# Patient Record
Sex: FEMALE | Race: OTHER | HISPANIC OR LATINO | URBAN - METROPOLITAN AREA
[De-identification: names, ages, dates, MRNs, and addresses within clinical notes are randomized per-mention and may not be internally consistent; named-entity substitution may affect disease eponyms.]

---

## 2021-03-08 ENCOUNTER — INPATIENT (INPATIENT)
Facility: HOSPITAL | Age: 26
LOS: 1 days | Discharge: HOME | End: 2021-03-10
Attending: STUDENT IN AN ORGANIZED HEALTH CARE EDUCATION/TRAINING PROGRAM | Admitting: STUDENT IN AN ORGANIZED HEALTH CARE EDUCATION/TRAINING PROGRAM
Payer: MEDICAID

## 2021-03-08 VITALS
RESPIRATION RATE: 16 BRPM | HEART RATE: 105 BPM | DIASTOLIC BLOOD PRESSURE: 68 MMHG | TEMPERATURE: 100 F | OXYGEN SATURATION: 98 % | SYSTOLIC BLOOD PRESSURE: 112 MMHG

## 2021-03-08 LAB
ALBUMIN SERPL ELPH-MCNC: 4.1 G/DL — SIGNIFICANT CHANGE UP (ref 3.3–5)
ALP SERPL-CCNC: 82 U/L — SIGNIFICANT CHANGE UP (ref 40–120)
ALT FLD-CCNC: 83 U/L — HIGH (ref 4–33)
ANION GAP SERPL CALC-SCNC: 9 MMOL/L — SIGNIFICANT CHANGE UP (ref 7–14)
AST SERPL-CCNC: 58 U/L — HIGH (ref 4–32)
BILIRUB SERPL-MCNC: <0.2 MG/DL — SIGNIFICANT CHANGE UP (ref 0.2–1.2)
BUN SERPL-MCNC: 4 MG/DL — LOW (ref 7–23)
CALCIUM SERPL-MCNC: 8.5 MG/DL — SIGNIFICANT CHANGE UP (ref 8.4–10.5)
CHLORIDE SERPL-SCNC: 103 MMOL/L — SIGNIFICANT CHANGE UP (ref 98–107)
CO2 SERPL-SCNC: 27 MMOL/L — SIGNIFICANT CHANGE UP (ref 22–31)
CREAT SERPL-MCNC: 0.67 MG/DL — SIGNIFICANT CHANGE UP (ref 0.5–1.3)
GLUCOSE SERPL-MCNC: 104 MG/DL — HIGH (ref 70–99)
HCT VFR BLD CALC: 36.9 % — SIGNIFICANT CHANGE UP (ref 34.5–45)
HGB BLD-MCNC: 12.1 G/DL — SIGNIFICANT CHANGE UP (ref 11.5–15.5)
IANC: 3.28 K/UL — SIGNIFICANT CHANGE UP (ref 1.5–8.5)
MCHC RBC-ENTMCNC: 28.1 PG — SIGNIFICANT CHANGE UP (ref 27–34)
MCHC RBC-ENTMCNC: 32.8 GM/DL — SIGNIFICANT CHANGE UP (ref 32–36)
MCV RBC AUTO: 85.8 FL — SIGNIFICANT CHANGE UP (ref 80–100)
PLATELET # BLD AUTO: 118 K/UL — LOW (ref 150–400)
POTASSIUM SERPL-MCNC: 3.8 MMOL/L — SIGNIFICANT CHANGE UP (ref 3.5–5.3)
POTASSIUM SERPL-SCNC: 3.8 MMOL/L — SIGNIFICANT CHANGE UP (ref 3.5–5.3)
PROT SERPL-MCNC: 7.1 G/DL — SIGNIFICANT CHANGE UP (ref 6–8.3)
RBC # BLD: 4.3 M/UL — SIGNIFICANT CHANGE UP (ref 3.8–5.2)
RBC # FLD: 13.5 % — SIGNIFICANT CHANGE UP (ref 10.3–14.5)
SODIUM SERPL-SCNC: 139 MMOL/L — SIGNIFICANT CHANGE UP (ref 135–145)
WBC # BLD: 4.91 K/UL — SIGNIFICANT CHANGE UP (ref 3.8–10.5)
WBC # FLD AUTO: 4.91 K/UL — SIGNIFICANT CHANGE UP (ref 3.8–10.5)

## 2021-03-08 PROCEDURE — 93010 ELECTROCARDIOGRAM REPORT: CPT

## 2021-03-08 PROCEDURE — 99285 EMERGENCY DEPT VISIT HI MDM: CPT | Mod: 25

## 2021-03-08 PROCEDURE — 71045 X-RAY EXAM CHEST 1 VIEW: CPT | Mod: 26

## 2021-03-08 RX ORDER — MAGNESIUM SULFATE 500 MG/ML
2 VIAL (ML) INJECTION ONCE
Refills: 0 | Status: COMPLETED | OUTPATIENT
Start: 2021-03-08 | End: 2021-03-08

## 2021-03-08 RX ORDER — SODIUM CHLORIDE 9 MG/ML
1000 INJECTION INTRAMUSCULAR; INTRAVENOUS; SUBCUTANEOUS ONCE
Refills: 0 | Status: COMPLETED | OUTPATIENT
Start: 2021-03-08 | End: 2021-03-08

## 2021-03-08 RX ORDER — KETOROLAC TROMETHAMINE 30 MG/ML
30 SYRINGE (ML) INJECTION ONCE
Refills: 0 | Status: DISCONTINUED | OUTPATIENT
Start: 2021-03-08 | End: 2021-03-08

## 2021-03-08 RX ORDER — ALBUTEROL 90 UG/1
2.5 AEROSOL, METERED ORAL
Refills: 0 | Status: COMPLETED | OUTPATIENT
Start: 2021-03-08 | End: 2021-03-08

## 2021-03-08 RX ORDER — ONDANSETRON 8 MG/1
4 TABLET, FILM COATED ORAL ONCE
Refills: 0 | Status: COMPLETED | OUTPATIENT
Start: 2021-03-08 | End: 2021-03-08

## 2021-03-08 RX ADMIN — Medication 30 MILLIGRAM(S): at 20:37

## 2021-03-08 RX ADMIN — SODIUM CHLORIDE 1000 MILLILITER(S): 9 INJECTION INTRAMUSCULAR; INTRAVENOUS; SUBCUTANEOUS at 20:37

## 2021-03-08 RX ADMIN — ONDANSETRON 4 MILLIGRAM(S): 8 TABLET, FILM COATED ORAL at 21:08

## 2021-03-08 RX ADMIN — Medication 30 MILLIGRAM(S): at 22:22

## 2021-03-08 RX ADMIN — Medication 100 MILLIGRAM(S): at 20:37

## 2021-03-08 RX ADMIN — Medication 40 MILLIGRAM(S): at 21:18

## 2021-03-08 RX ADMIN — ALBUTEROL 2.5 MILLIGRAM(S): 90 AEROSOL, METERED ORAL at 21:08

## 2021-03-08 RX ADMIN — SODIUM CHLORIDE 1000 MILLILITER(S): 9 INJECTION INTRAMUSCULAR; INTRAVENOUS; SUBCUTANEOUS at 22:23

## 2021-03-08 RX ADMIN — ALBUTEROL 2.5 MILLIGRAM(S): 90 AEROSOL, METERED ORAL at 20:50

## 2021-03-08 NOTE — ED PROVIDER NOTE - PROGRESS NOTE DETAILS
SAIGE Munson: Pt reassessed, states breathing is somewhat better however pt is tachycardic, still wheezing and is stable yet appears uncomfortable and ill. Paged the hospitalist, planning admission to medicine.

## 2021-03-08 NOTE — ED PROVIDER NOTE - OBJECTIVE STATEMENT
24 Y/O F PMH Pre-DM, HLD states that she was diagnosed with COVID-19 on 3/1. Pt states that she has been having SOB x 5 days. Pt states she has been taking Tylenol for her fever and pain but states she has been having a persistent fever in spite of the Tylenol. Pt also endorses pleuritic CP worse with deep breathing. Pt also states that she has been nauseous and fatigued. Pt denies any other sx or acute complaints.

## 2021-03-08 NOTE — ED PROVIDER NOTE - CARE PLAN
Principal Discharge DX:	Chest tightness  Secondary Diagnosis:	Viral syndrome  Secondary Diagnosis:	Fatigue   Principal Discharge DX:	Chest tightness  Secondary Diagnosis:	Viral syndrome  Secondary Diagnosis:	Fatigue  Secondary Diagnosis:	COVID-19   Principal Discharge DX:	Pneumonia  Secondary Diagnosis:	COVID-19

## 2021-03-08 NOTE — ED PROVIDER NOTE - ATTENDING CONTRIBUTION TO CARE
Seen and examined, 7d hx of cough, 5d of fevers and SOB, now with inc. pleuritic CP, no leg c/o, no N/V/D. Pt. c/o exertional dyspnea for past 2d, no orthopnea, no edema, no PND. Denies hx of asthma but has used inhaler in the past. MMM, scant wheezing, heart reg, abd soft, NT to palp, no CVAT, no edema, NT calves, good pulses.

## 2021-03-08 NOTE — ED ADULT TRIAGE NOTE - CHIEF COMPLAINT QUOTE
Pt presents to ED for chest pain, body aches, fevers, SOB x5days. Pt states Tmax 103F and " Since yesterday I have not been able to break this fever and it got to a point where I felt I was going to faint." Pt states she tested positive for COVID on 3/1.  Pmhx of migraines.

## 2021-03-08 NOTE — ED ADULT NURSE NOTE - OBJECTIVE STATEMENT
Pt received to intake room 3. Pt A&Ox4, ambulatory. Pt states that she tested positive for COVID19 at 3/1 after symptoms started on 2/27. Pt endorses worsening SOB, body aches, bone painl, nausea without vomiting, fevers and chills unrelieved by Tylenol, headaches and weakness. Respirations equal and unlabored, no acute distress noted. Pt currently sating 98% on room air. Denies chest pain, palpitations. 20g IV placed in left AC, labs drawn and sent as ordered. Cardiac monitor in place, sinus rhythm noted. Vital signs as noted, comfort measures provided, call bell within reach. Will continue to monitor.

## 2021-03-08 NOTE — ED PROVIDER NOTE - CLINICAL SUMMARY MEDICAL DECISION MAKING FREE TEXT BOX
24 Y/O F PMH Pre-DM, HLD states that she was diagnosed with COVID-19 on 3/1. Pt states that she has been having SOB x 5 days. Pt states she has been taking Tylenol for her fever and pain but states she has been having a persistent fever in spite of the Tylenol. Plan is labs to eval for anemia or electrolyte disturbance and CXR to eval for consolidation, Albuterol for symptoms, no calf tenderness, will hydrate, likely eventual D/C. 24 Y/O F PMH Pre-DM, HLD states that she was diagnosed with COVID-19 on 3/1. Pt states that she has been having SOB x 5 days. Pt states she has been taking Tylenol for her fever and pain but states she has been having a persistent fever in spite of the Tylenol. Plan is labs to eval for anemia or electrolyte disturbance and CXR to eval for consolidation, Albuterol for symptoms, no calf tenderness, will hydrate, poss. D/C if improves.

## 2021-03-09 DIAGNOSIS — R07.89 OTHER CHEST PAIN: ICD-10-CM

## 2021-03-09 DIAGNOSIS — U07.1 COVID-19: ICD-10-CM

## 2021-03-09 DIAGNOSIS — Z29.9 ENCOUNTER FOR PROPHYLACTIC MEASURES, UNSPECIFIED: ICD-10-CM

## 2021-03-09 DIAGNOSIS — R09.1 PLEURISY: ICD-10-CM

## 2021-03-09 LAB
A1C WITH ESTIMATED AVERAGE GLUCOSE RESULT: 5.8 % — HIGH (ref 4–5.6)
ANION GAP SERPL CALC-SCNC: 9 MMOL/L — SIGNIFICANT CHANGE UP (ref 7–14)
APTT BLD: 30.4 SEC — SIGNIFICANT CHANGE UP (ref 27–36.3)
BASE EXCESS BLDV CALC-SCNC: -0.3 MMOL/L — SIGNIFICANT CHANGE UP (ref -3–2)
BASOPHILS # BLD AUTO: 0 K/UL — SIGNIFICANT CHANGE UP (ref 0–0.2)
BASOPHILS NFR BLD AUTO: 0 % — SIGNIFICANT CHANGE UP (ref 0–2)
BUN SERPL-MCNC: 4 MG/DL — LOW (ref 7–23)
CALCIUM SERPL-MCNC: 8.3 MG/DL — LOW (ref 8.4–10.5)
CHLORIDE SERPL-SCNC: 105 MMOL/L — SIGNIFICANT CHANGE UP (ref 98–107)
CK SERPL-CCNC: 37 U/L — SIGNIFICANT CHANGE UP (ref 25–170)
CK SERPL-CCNC: 49 U/L — SIGNIFICANT CHANGE UP (ref 25–170)
CO2 SERPL-SCNC: 24 MMOL/L — SIGNIFICANT CHANGE UP (ref 22–31)
CREAT SERPL-MCNC: 0.5 MG/DL — SIGNIFICANT CHANGE UP (ref 0.5–1.3)
CRP SERPL-MCNC: 45.6 MG/L — HIGH
D DIMER BLD IA.RAPID-MCNC: 398 NG/ML DDU — HIGH
EOSINOPHIL # BLD AUTO: 0 K/UL — SIGNIFICANT CHANGE UP (ref 0–0.5)
EOSINOPHIL NFR BLD AUTO: 0 % — SIGNIFICANT CHANGE UP (ref 0–6)
ESTIMATED AVERAGE GLUCOSE: 120 MG/DL — HIGH (ref 68–114)
FERRITIN SERPL-MCNC: 400 NG/ML — HIGH (ref 15–150)
FIBRINOGEN PPP-MCNC: 579 MG/DL — HIGH (ref 290–520)
GLUCOSE SERPL-MCNC: 165 MG/DL — HIGH (ref 70–99)
HCO3 BLDV-SCNC: 24 MMOL/L — SIGNIFICANT CHANGE UP (ref 20–27)
IMM GRANULOCYTES NFR BLD AUTO: 0.4 % — SIGNIFICANT CHANGE UP (ref 0–1.5)
INR BLD: 1.14 RATIO — SIGNIFICANT CHANGE UP (ref 0.88–1.16)
LYMPHOCYTES # BLD AUTO: 1.42 K/UL — SIGNIFICANT CHANGE UP (ref 1–3.3)
LYMPHOCYTES # BLD AUTO: 28.9 % — SIGNIFICANT CHANGE UP (ref 13–44)
MONOCYTES # BLD AUTO: 0.19 K/UL — SIGNIFICANT CHANGE UP (ref 0–0.9)
MONOCYTES NFR BLD AUTO: 3.9 % — SIGNIFICANT CHANGE UP (ref 2–14)
NEUTROPHILS # BLD AUTO: 3.28 K/UL — SIGNIFICANT CHANGE UP (ref 1.8–7.4)
NEUTROPHILS NFR BLD AUTO: 66.8 % — SIGNIFICANT CHANGE UP (ref 43–77)
NRBC # BLD: 0 /100 WBCS — SIGNIFICANT CHANGE UP
NRBC # FLD: 0 K/UL — SIGNIFICANT CHANGE UP
PCO2 BLDV: 36 MMHG — LOW (ref 41–51)
PH BLDV: 7.42 — SIGNIFICANT CHANGE UP (ref 7.32–7.43)
PO2 BLDV: 68 MMHG — HIGH (ref 35–40)
POTASSIUM SERPL-MCNC: 4.5 MMOL/L — SIGNIFICANT CHANGE UP (ref 3.5–5.3)
POTASSIUM SERPL-SCNC: 4.5 MMOL/L — SIGNIFICANT CHANGE UP (ref 3.5–5.3)
PROCALCITONIN SERPL-MCNC: 0.12 NG/ML — HIGH (ref 0.02–0.1)
PROTHROM AB SERPL-ACNC: 13 SEC — SIGNIFICANT CHANGE UP (ref 10.6–13.6)
SAO2 % BLDV: 94 % — HIGH (ref 60–85)
SARS-COV-2 RNA SPEC QL NAA+PROBE: DETECTED
SODIUM SERPL-SCNC: 138 MMOL/L — SIGNIFICANT CHANGE UP (ref 135–145)

## 2021-03-09 PROCEDURE — 71275 CT ANGIOGRAPHY CHEST: CPT | Mod: 26

## 2021-03-09 PROCEDURE — 99223 1ST HOSP IP/OBS HIGH 75: CPT

## 2021-03-09 PROCEDURE — 12345: CPT | Mod: NC

## 2021-03-09 RX ORDER — BENZOCAINE AND MENTHOL 5; 1 G/100ML; G/100ML
1 LIQUID ORAL THREE TIMES A DAY
Refills: 0 | Status: DISCONTINUED | OUTPATIENT
Start: 2021-03-09 | End: 2021-03-10

## 2021-03-09 RX ORDER — ENOXAPARIN SODIUM 100 MG/ML
40 INJECTION SUBCUTANEOUS DAILY
Refills: 0 | Status: DISCONTINUED | OUTPATIENT
Start: 2021-03-09 | End: 2021-03-09

## 2021-03-09 RX ORDER — SODIUM CHLORIDE 9 MG/ML
1000 INJECTION INTRAMUSCULAR; INTRAVENOUS; SUBCUTANEOUS
Refills: 0 | Status: DISCONTINUED | OUTPATIENT
Start: 2021-03-09 | End: 2021-03-09

## 2021-03-09 RX ORDER — IBUPROFEN 200 MG
400 TABLET ORAL EVERY 6 HOURS
Refills: 0 | Status: DISCONTINUED | OUTPATIENT
Start: 2021-03-09 | End: 2021-03-10

## 2021-03-09 RX ORDER — ENOXAPARIN SODIUM 100 MG/ML
40 INJECTION SUBCUTANEOUS EVERY 12 HOURS
Refills: 0 | Status: DISCONTINUED | OUTPATIENT
Start: 2021-03-09 | End: 2021-03-10

## 2021-03-09 RX ADMIN — Medication 50 GRAM(S): at 00:32

## 2021-03-09 RX ADMIN — BENZOCAINE AND MENTHOL 1 LOZENGE: 5; 1 LIQUID ORAL at 21:35

## 2021-03-09 RX ADMIN — SODIUM CHLORIDE 100 MILLILITER(S): 9 INJECTION INTRAMUSCULAR; INTRAVENOUS; SUBCUTANEOUS at 10:52

## 2021-03-09 RX ADMIN — ENOXAPARIN SODIUM 40 MILLIGRAM(S): 100 INJECTION SUBCUTANEOUS at 21:35

## 2021-03-09 RX ADMIN — SODIUM CHLORIDE 100 MILLILITER(S): 9 INJECTION INTRAMUSCULAR; INTRAVENOUS; SUBCUTANEOUS at 06:07

## 2021-03-09 RX ADMIN — Medication 400 MILLIGRAM(S): at 22:35

## 2021-03-09 RX ADMIN — Medication 400 MILLIGRAM(S): at 14:45

## 2021-03-09 RX ADMIN — Medication 400 MILLIGRAM(S): at 15:30

## 2021-03-09 RX ADMIN — Medication 400 MILLIGRAM(S): at 21:35

## 2021-03-09 NOTE — H&P ADULT - PROBLEM SELECTOR PLAN 1
-pt saturating above  currently  on RA, so no indication currently to tread with steroids or remdesivir   -supportive care with tylenol, nsaid, IVF  -dvt ppx

## 2021-03-09 NOTE — H&P ADULT - PROBLEM SELECTOR PLAN 2
-pleurisy and dyspnea with minimal exertion in setting clear cxr raises concern for PE; CTA chest ordered. Pt currently menstruating

## 2021-03-09 NOTE — H&P ADULT - NSHPLABSRESULTS_GEN_ALL_CORE
12.1   4.91  )-----------( 118      ( 08 Mar 2021 21:12 )             36.9     03-08    139  |  103  |  4<L>  ----------------------------<  104<H>  3.8   |  27  |  0.67    Ca    8.5      08 Mar 2021 21:12    TPro  7.1  /  Alb  4.1  /  TBili  <0.2  /  DBili  x   /  AST  58<H>  /  ALT  83<H>  /  AlkPhos  82  03-08    CAPILLARY BLOOD GLUCOSE            Vital Signs Last 24 Hrs  T(C): 37.2 (09 Mar 2021 00:40), Max: 39 (08 Mar 2021 20:54)  T(F): 99 (09 Mar 2021 00:40), Max: 102.2 (08 Mar 2021 20:54)  HR: 97 (09 Mar 2021 00:40) (97 - 124)  BP: 102/55 (09 Mar 2021 00:40) (100/51 - 114/41)  BP(mean): --  RR: 20 (09 Mar 2021 00:40) (16 - 24)  SpO2: 96% (09 Mar 2021 00:40) (96% - 98%)

## 2021-03-09 NOTE — H&P ADULT - NSHPREVIEWOFSYSTEMS_GEN_ALL_CORE
Review of Systems:   CONSTITUTIONAL: + fever,  fatigue  EYES: No eye pain, visual disturbances, or discharge  ENMT:  No difficulty hearing, tinnitus, vertigo; No sinus or throat pain  NECK: No pain or stiffness  RESPIRATORY: + cough, pluerisy, FORD  CARDIOVASCULAR: No palpitations, dizziness, or leg swelling  GASTROINTESTINAL: No abdominal or epigastric pain. No nausea, vomiting, or hematemesis; No diarrhea or constipation. No melena or hematochezia.  GENITOURINARY: No dysuria, frequency, hematuria, or incontinence  NEUROLOGICAL: No headaches  SKIN: No itching, burning, rashes, or lesions   LYMPH NODES: No enlarged glands  ENDOCRINE: No heat or cold intolerance; No hair loss  MUSCULOSKELETAL: myalgias   ALLERY AND IMMUNOLOGIC: No hives or eczema

## 2021-03-09 NOTE — H&P ADULT - HISTORY OF PRESENT ILLNESS
24 yo f covid+ march 1st; in last few days, pt reports high-grade fevers suboptimally responsive to tylenol as well as generalized weakness, myalgias, cough. Also endorses pleurisy as well as dyspnea with minimal exertion. CXR prelim clear. D-dimer 398. Presented with , increased to 124 after 2 rounds of albuterol. Currently 100. Tmax 102.2 (O) 26 yo f covid+ march 1st; in last few days, pt reports high-grade fevers suboptimally responsive to tylenol as well as generalized weakness, myalgias, cough. Also endorses pleurisy as well as dyspnea with minimal exertion. CXR prelim clear. D-dimer 398. Presented with , increased to 124 after 2 rounds of albuterol. Currently . Tmax 102.2 (O). Denies h/o copd or asthma

## 2021-03-10 VITALS
SYSTOLIC BLOOD PRESSURE: 102 MMHG | TEMPERATURE: 99 F | DIASTOLIC BLOOD PRESSURE: 62 MMHG | HEART RATE: 79 BPM | OXYGEN SATURATION: 98 % | RESPIRATION RATE: 20 BRPM

## 2021-03-10 DIAGNOSIS — R74.01 ELEVATION OF LEVELS OF LIVER TRANSAMINASE LEVELS: ICD-10-CM

## 2021-03-10 LAB
D DIMER BLD IA.RAPID-MCNC: 186 NG/ML DDU — SIGNIFICANT CHANGE UP
HCT VFR BLD CALC: 36.9 % — SIGNIFICANT CHANGE UP (ref 34.5–45)
HGB BLD-MCNC: 11.9 G/DL — SIGNIFICANT CHANGE UP (ref 11.5–15.5)
MCHC RBC-ENTMCNC: 27.9 PG — SIGNIFICANT CHANGE UP (ref 27–34)
MCHC RBC-ENTMCNC: 32.2 GM/DL — SIGNIFICANT CHANGE UP (ref 32–36)
MCV RBC AUTO: 86.4 FL — SIGNIFICANT CHANGE UP (ref 80–100)
NRBC # BLD: 0 /100 WBCS — SIGNIFICANT CHANGE UP
NRBC # FLD: 0 K/UL — SIGNIFICANT CHANGE UP
PLATELET # BLD AUTO: 171 K/UL — SIGNIFICANT CHANGE UP (ref 150–400)
RBC # BLD: 4.27 M/UL — SIGNIFICANT CHANGE UP (ref 3.8–5.2)
RBC # FLD: 13.6 % — SIGNIFICANT CHANGE UP (ref 10.3–14.5)
WBC # BLD: 4.3 K/UL — SIGNIFICANT CHANGE UP (ref 3.8–10.5)
WBC # FLD AUTO: 4.3 K/UL — SIGNIFICANT CHANGE UP (ref 3.8–10.5)

## 2021-03-10 PROCEDURE — 99238 HOSP IP/OBS DSCHRG MGMT 30/<: CPT

## 2021-03-10 RX ORDER — IBUPROFEN 200 MG
1 TABLET ORAL
Qty: 0 | Refills: 0 | DISCHARGE
Start: 2021-03-10

## 2021-03-10 RX ORDER — ACETAMINOPHEN 500 MG
1000 TABLET ORAL ONCE
Refills: 0 | Status: DISCONTINUED | OUTPATIENT
Start: 2021-03-10 | End: 2021-03-10

## 2021-03-10 RX ADMIN — Medication 400 MILLIGRAM(S): at 05:48

## 2021-03-10 RX ADMIN — BENZOCAINE AND MENTHOL 1 LOZENGE: 5; 1 LIQUID ORAL at 05:48

## 2021-03-10 RX ADMIN — Medication 400 MILLIGRAM(S): at 06:48

## 2021-03-10 RX ADMIN — ENOXAPARIN SODIUM 40 MILLIGRAM(S): 100 INJECTION SUBCUTANEOUS at 09:00

## 2021-03-10 NOTE — PROGRESS NOTE ADULT - PROBLEM SELECTOR PLAN 1
-pt sating well on RA, so no indication currently to tread with steroids or remdesivir   -supportive care prn  -dvt ppx  -Monitor inflammatory markers
Continues to have no O2 req  Does not meet criteria for rem/dex  chest pain has resolved  c/w supportive care tylenol PRN, fluids

## 2021-03-10 NOTE — DISCHARGE NOTE PROVIDER - NSDCMRMEDTOKEN_GEN_ALL_CORE_FT
ibuprofen 400 mg oral tablet: 1 tab(s) orally every 6 hours, As needed, Temp greater or equal to 38C (100.4F), Mild Pain (1 - 3), Moderate Pain (4 - 6)  Tessalon Perles 100 mg oral capsule: 1 cap(s) orally 3 times a day

## 2021-03-10 NOTE — DISCHARGE NOTE NURSING/CASE MANAGEMENT/SOCIAL WORK - PATIENT PORTAL LINK FT
You can access the FollowMyHealth Patient Portal offered by [facility] by registering at the following website: [email or web address]. By joining [facility]’s FollowMyHealth portal, you will also be able to view your health information using other applications (apps) compatible with our system.

## 2021-03-10 NOTE — PROGRESS NOTE ADULT - PROBLEM SELECTOR PLAN 2
-pleurisy and dyspnea with minimal exertion in setting clear cxr and negative PE study.  -prn pain control. Monitor sx.
Moderate ALT/AST elevation in setting of acute viral illness  monitor trend while inpatient

## 2021-03-10 NOTE — DISCHARGE NOTE PROVIDER - HOSPITAL COURSE
26 yo f covid + with pleurisy, cisse    COVID-19.  Plan: -pt sating well on RA, so no indication currently to tread with steroids or remdesivir   -supportive care prn  -dvt ppx  -Monitor inflammatory markers.      Problem/Plan - 2:  ·  Problem: Pleurisy.  Plan: -pleurisy and dyspnea with minimal exertion in setting clear cxr and negative PE study.  -prn pain control. Monitor sx.      Problem/Plan - 3:  ·  Problem: DVT prophylaxis.  Plan: Lovenox   26 yo f with history of Migraines, Pre-Diabetes, Hyperplidemia found to be covid +. Also complained of chest pain and dyspnea on exertion     COVID-19:  -pt sating well on RA, so no indication currently to tread with steroids or remdesivir as pt is not needing supplemental oxygen       Pleurisy: pleurisy and dyspnea with minimal exertion in setting clear cxr and negative PE study. Pain control offered    26 yo f with history of Migraines, Pre-Diabetes, Hyperplidemia found to be covid +. Also complained of chest pain and dyspnea on exertion     COVID-19:  -pt sating well on RA, so no indication currently to tread with steroids or remdesivir as pt is not needing supplemental oxygen       Pleurisy: pleurisy and dyspnea with minimal exertion in setting clear cxr and negative PE study. Pain control offered     Discussed case with attending. Pt is doing well and currently has no complaints. Explained to pt and opportunity was given to ask questions and answers. Pt is comfortable and sating well. No complaints at this time. Attending in agreement for discharge

## 2021-03-10 NOTE — DISCHARGE NOTE PROVIDER - NSDCCPCAREPLAN_GEN_ALL_CORE_FT
PRINCIPAL DISCHARGE DIAGNOSIS  Diagnosis: COVID-19  Assessment and Plan of Treatment: You have been diagnosed with the COVID-19 virus during your hospital stay. You must self quarantine to complete a 14 day time period.  Monitor for fevers, shortness of breath and cough primarily.  Monitor your temperature daily to notice any changes and increases.    It has been determined that you no longer need hospitalization and can recover while remaining in self-quarantine at home. You should follow the prevention steps below until a healthcare provider or local or state health department says you can return to your normal activities.  1. You should restrict activities outside your home, except for getting medical care.  2. Do not go to work, school, or public areas.  3. Avoid using public transportation, ride-sharing, or taxis.  4. Separate yourself from other people and animals in your home.  5. Call ahead before visiting your doctor.  6. Wear a facemask.  7. Cover your coughs and sneezes.  8. Clean your hands often.  9. Avoid sharing personal household items.  10. Clean all “high-touch” surfaces everyday.  11. Monitor your symptoms.  If you have a medical emergency and need to call 911, notify the dispatch personnel that you have COVID-19 If possible, put on a facemask before emergency medical services arrive.  12. Stopping home isolation.  Patients with confirmed COVID-19 should remain under home isolation precautions for 14 days since the positive COVID-19 test and until the risk of secondary transmission to others is thought to be low. The decision to discontinue home isolation precautions should be made on a case-by-case basis, in consultation with healthcare providers and state and local health departments. Your [facility] Department of Health can be reached at [phone] for further information about COVID-19.         SECONDARY DISCHARGE DIAGNOSES  Diagnosis: Pre-diabetes  Assessment and Plan of Treatment: You are a pre-diabetic. It is important to keep a low cardbohydrate diet and exercise daily. You should monitor your hgb A1C routinely with your primary care physician    Diagnosis: Pleurisy  Assessment and Plan of Treatment: The chest pain you experienced was related to COVID infection. This was not related to your heart and you did not have a pulmonary embolism. You recieved a work up for this. You can take tylenol 650mg every 4-6 hours for pain     PRINCIPAL DISCHARGE DIAGNOSIS  Diagnosis: COVID-19  Assessment and Plan of Treatment: You have been diagnosed with the COVID-19 virus during your hospital stay. You must self quarantine to complete a 14 day time period.  Monitor for fevers, shortness of breath and cough primarily.  Monitor your temperature daily to notice any changes and increases.    It has been determined that you no longer need hospitalization and can recover while remaining in self-quarantine at home. You should follow the prevention steps below until a healthcare provider or local or state health department says you can return to your normal activities.  1. You should restrict activities outside your home, except for getting medical care.  2. Do not go to work, school, or public areas.  3. Avoid using public transportation, ride-sharing, or taxis.  4. Separate yourself from other people and animals in your home.  5. Call ahead before visiting your doctor.  6. Wear a facemask.  7. Cover your coughs and sneezes.  8. Clean your hands often.  9. Avoid sharing personal household items.  10. Clean all “high-touch” surfaces everyday.  11. Monitor your symptoms.  If you have a medical emergency and need to call 911, notify the dispatch personnel that you have COVID-19 If possible, put on a facemask before emergency medical services arrive.  12. Stopping home isolation.  Patients with confirmed COVID-19 should remain under home isolation precautions for 14 days since the positive COVID-19 test and until the risk of secondary transmission to others is thought to be low. The decision to discontinue home isolation precautions should be made on a case-by-case basis, in consultation with healthcare providers and state and local health departments. Your [facility] Department of Health can be reached at [phone] for further information about COVID-19.   Follow up with your private doctor at [facility]. Also go to [facility] to  your prescription for cough. Take as directed         SECONDARY DISCHARGE DIAGNOSES  Diagnosis: Pre-diabetes  Assessment and Plan of Treatment: You are a pre-diabetic. It is important to keep a low cardbohydrate diet and exercise daily. You should monitor your hgb A1C routinely with your primary care physician    Diagnosis: Pleurisy  Assessment and Plan of Treatment: The chest pain you experienced was related to COVID infection. This was not related to your heart and you did not have a pulmonary embolism. You recieved a work up for this. You can take tylenol 650mg every 4-6 hours for pain

## 2021-03-10 NOTE — DISCHARGE NOTE PROVIDER - CARE PROVIDER_API CALL
[facility],   [address] [address]  Phone: [phone]  Fax: (   )    -  Established Patient  Follow Up Time: 1 week

## 2021-03-10 NOTE — DISCHARGE NOTE PROVIDER - PROVIDER TOKENS
FREE:[LAST:[[facility]],PHONE:[phone]],FAX:[(   )    -],ADDRESS:[[address] [place] [address]],FOLLOWUP:[1 week],ESTABLISHEDPATIENT:[T]]

## 2021-03-10 NOTE — PROGRESS NOTE ADULT - SUBJECTIVE AND OBJECTIVE BOX
Patient is a 25y old  Female who presents with a chief complaint of worsening covid (09 Mar 2021 02:07)    SUBJECTIVE / OVERNIGHT EVENTS: No acute events overnight. Reports SOB subjectively at the time of this eval.    MEDICATIONS  (STANDING):  enoxaparin Injectable 40 milliGRAM(s) SubCutaneous every 12 hours    MEDICATIONS  (PRN):  benzocaine 15 mG/menthol 3.6 mG (Sugar-Free) Lozenge 1 Lozenge Oral three times a day PRN Sore Throat  ibuprofen  Tablet. 400 milliGRAM(s) Oral every 6 hours PRN Temp greater or equal to 38C (100.4F), Mild Pain (1 - 3), Moderate Pain (4 - 6)    T(C): 36.5 (03-09-21 @ 13:57), Max: 39 (03-08-21 @ 20:54)  HR: 95 (03-09-21 @ 13:57) (88 - 124)  BP: 117/73 (03-09-21 @ 13:57) (100/51 - 118/70)  RR: 18 (03-09-21 @ 13:57) (16 - 24)  SpO2: 97% (03-09-21 @ 13:57) (96% - 98%)  PHYSICAL EXAM:  GENERAL: NAD, well-developed  HEAD:  Atraumatic, Normocephalic  EYES: EOMI, PERRLA, conjunctiva and sclera clear  NECK: Supple, No JVD  CHEST/LUNG: Clear to auscultation bilaterally; No wheeze  HEART: Regular rate and rhythm; No murmurs, rubs, or gallops  ABDOMEN: Soft, Nontender, Nondistended; Bowel sounds present  EXTREMITIES:  2+ Peripheral Pulses, No clubbing, cyanosis, or edema  PSYCH: AAOx3  NEUROLOGY: non-focal  SKIN: No rashes or lesions    LABS:                        12.1   4.91  )-----------( 118      ( 08 Mar 2021 21:12 )             36.9     WBC Trend: 4.91<--  03-09    138  |  105  |  4<L>  ----------------------------<  165<H>  4.5   |  24  |  0.50    Ca    8.3<L>      09 Mar 2021 06:49    TPro  7.1  /  Alb  4.1  /  TBili  <0.2  /  DBili  x   /  AST  58<H>  /  ALT  83<H>  /  AlkPhos  82  03-08    Creatinine Trend: 0.50<--, 0.67<--  PT/INR - ( 09 Mar 2021 06:52 )   PT: 13.0 sec;   INR: 1.14 ratio         PTT - ( 09 Mar 2021 06:52 )  PTT:30.4 sec  CARDIAC MARKERS ( 09 Mar 2021 06:49 )  x     / x     / 49 U/L / x     / x      CARDIAC MARKERS ( 09 Mar 2021 00:48 )  x     / x     / 37 U/L / x     / x    
Willy Conklin M.D.  Hospitalist 8am-7pm  Division of Hospital Medicine  [facility]  cell [phone]  pager [phone]    Patient is a 25y old  Female who presents with a chief complaint of worsening covid (10 Mar 2021 10:23)    SUBJECTIVE / OVERNIGHT EVENTS: Patient seen and examined. No acute overnight events, no subjective complaints.   Patient denies chest pain, shortness of breath, fevers.  Tele events, if applicable:  SPO2:    OBJECTIVE:  Vital Signs Last 24 Hrs  T(C): 37.1 (10 Mar 2021 10:00), Max: 37.1 (10 Mar 2021 10:00)  T(F): 98.7 (10 Mar 2021 10:00), Max: 98.7 (10 Mar 2021 10:00)  HR: 79 (10 Mar 2021 10:00) (79 - 95)  BP: 102/62 (10 Mar 2021 10:00) (100/60 - 117/73)  RR: 20 (10 Mar 2021 10:00) (16 - 20)  SpO2: 98% (10 Mar 2021 10:00) (97% - 99%)    I&O's Summary    10 Mar 2021 07:01  -  10 Mar 2021 11:40  --------------------------------------------------------  IN: 250 mL / OUT: 0 mL / NET: 250 mL      Physical Examination:  GEN: young female well appearing in NAD  HEAD/NECK: Normocephalic, atraumatic  RESP: no accessory muscle use, B/L air entry, scattered rhonchi, no wheezing  CARDIO: regular rate/rhythm  ABD: soft, NT, ND, +BS  PSYCH: A&Ox3, normal affect  SKIN: warm, dry, in tact, no rashes  NEURO: no focal neurologic deficits appreciated  EXT: no lower extremity edema, no cyanosis  VASC: good peripheral pulses      (03-10 @ 11:04)                      11.9  4.30 )-----------( 171                 36.9    03-09    138  |  105  |  4<L>  ----------------------------<  165<H>  4.5   |  24  |  0.50    Ca    8.3<L>      09 Mar 2021 06:49    TPro  7.1  /  Alb  4.1  /  TBili  <0.2  /  DBili  x   /  AST  58<H>  /  ALT  83<H>  /  AlkPhos  82  03-08    ( 09 Mar 2021 06:52 )   PT: 13.0 sec;   INR: 1.14 ratio;       PTT:30.4 sec  CARDIAC MARKERS ( 09 Mar 2021 06:49 )  Trop x     / CK 49 U/L / CKMB x       CARDIAC MARKERS ( 09 Mar 2021 00:48 )  Trop x     / CK 37 U/L / CKMB x         COVID-19 PCR: Detected (09 Mar 2021 00:57)    Prothrombin Time, Plasma: 13.0 sec (03-09-21 @ 06:52)    Activated Partial Thromboplastin Time: 30.4 sec (03-09-21 @ 06:52)    I&O's Summary    10 Mar 2021 07:01  -  10 Mar 2021 11:40  --------------------------------------------------------  IN: 250 mL / OUT: 0 mL / NET: 250 mL    Consultant(s) Notes Reviewed:     MEDICATIONS  (STANDING):  enoxaparin Injectable 40 milliGRAM(s) SubCutaneous every 12 hours    MEDICATIONS  (PRN):  benzocaine 15 mG/menthol 3.6 mG (Sugar-Free) Lozenge 1 Lozenge Oral three times a day PRN Sore Throat  ibuprofen  Tablet. 400 milliGRAM(s) Oral every 6 hours PRN Temp greater or equal to 38C (100.4F), Mild Pain (1 - 3), Moderate Pain (4 - 6)

## 2021-03-10 NOTE — PROGRESS NOTE ADULT - ASSESSMENT
24 yo F COVID+ presents with fevers without hypoxia, CT negative for PE. 
26 yo f covid + with pleurisy, cisse

## 2021-03-10 NOTE — PROGRESS NOTE ADULT - PROBLEM SELECTOR PLAN 3
Lovenox for DVT ppx while inpatient  No pharmacologic ppx needed after d/c, patient is low risk  OK with d/c later today if remains improved, afebrile, and without O2 requirement. Approaching day 12 of illness.
Lovenox  Discharge planning

## 2024-03-11 NOTE — ED ADULT NURSE NOTE - SUICIDE SCREENING DEPRESSION
Julio C Doa,    Below please find a list of Behavioral Health Resources for patients with Medicaid. If you have any questions, please respond to this message and the [facility] team will endeavor to reply within 72 hours.     Support for Stressful Life Events, Loneliness or Social Isolation with [employer]  Call [phone] or download the richard online at [email or web address] This service is covered by most insurance companies. This service helps to reduce loneliness through the power of human connection and empathetic companionship. A real person trained as a compassionate care companion will answer the phone and/or messages on the richard 24/7.     [facility]   [address], [place] [address]  Call [phone] to book an appointment or visit [email or web address]    [facility]   [address] [facility] [place] [address]  First - complete the intake form online via [email or web address]   Second - call [phone] or email [email or web address].    [facility]   [address] [facility], [address], [facility] [address]  Call [phone] or email [email or web address] to book an appointment.    [facility]   [address] [facility] [address], [address], [place] [address]   Call [phone] or email [email or web address] to book an appointment.     [facility]    [address], [address], [place] [address]  Call [phone] to book an appointment.     [facility]   [address] [place] [address]  Call [phone] to book an appointment.     [facility]   ([employer] is not accepted. Call to confirm your health insurance is accepted.)  [address] [facility] [address]  Call [phone] to book an appointment.     [facility]   (Call to confirm your health insurance is accepted.)  [address] [facility],  [address], [place] [address]  [address] [facility] [facility], [place] [address]  Call [phone] ([place]) or [phone] ([place]) to book an appointment.     Thinking about Suicide or Self-Harm? Please call or text the National Suicide and Crisis Lifeline at 302, or National Suicide Prevention Lifeline at 0-094-643-OEWR (9422), or text the national Crisis Text Line by texting HELLO to 796086, or call the local Crisis Line at 462-513-3380.     Alcohol/Drug Helpline: 1-215.864.8980    Gambling Helpline: 1-802.165.5435    Patient has been provided the above information on 03/11/2024.     -  Cleopatra Faria     Negative

## 2025-06-06 NOTE — PROGRESS NOTE ADULT - PROBLEM/PLAN-2
DISPLAY PLAN FREE TEXT
DISPLAY PLAN FREE TEXT
[No Acute Distress] : no acute distress
[Well Nourished] : well nourished
[Well Developed] : well developed
[Well-Appearing] : well-appearing
[Normal Sclera/Conjunctiva] : normal sclera/conjunctiva
[PERRL] : pupils equal round and reactive to light
[EOMI] : extraocular movements intact
[Normal Outer Ear/Nose] : the outer ears and nose were normal in appearance
[Normal Oropharynx] : the oropharynx was normal
[No JVD] : no jugular venous distention
[No Lymphadenopathy] : no lymphadenopathy
[Supple] : supple
[Thyroid Normal, No Nodules] : the thyroid was normal and there were no nodules present
[No Respiratory Distress] : no respiratory distress 
[No Accessory Muscle Use] : no accessory muscle use
[Clear to Auscultation] : lungs were clear to auscultation bilaterally
[Normal Rate] : normal rate 
[Regular Rhythm] : with a regular rhythm
[Normal S1, S2] : normal S1 and S2
[No Murmur] : no murmur heard
[No Carotid Bruits] : no carotid bruits
[No Abdominal Bruit] : a ~M bruit was not heard ~T in the abdomen
[No Varicosities] : no varicosities
[Pedal Pulses Present] : the pedal pulses are present
[No Edema] : there was no peripheral edema
[No Palpable Aorta] : no palpable aorta
[No Extremity Clubbing/Cyanosis] : no extremity clubbing/cyanosis
[Soft] : abdomen soft
[Non Tender] : non-tender
[Non-distended] : non-distended
[No Masses] : no abdominal mass palpated
[No HSM] : no HSM
[Normal Bowel Sounds] : normal bowel sounds
[Normal Posterior Cervical Nodes] : no posterior cervical lymphadenopathy
[Normal Anterior Cervical Nodes] : no anterior cervical lymphadenopathy
[No CVA Tenderness] : no CVA  tenderness
[No Spinal Tenderness] : no spinal tenderness
[No Joint Swelling] : no joint swelling
[Grossly Normal Strength/Tone] : grossly normal strength/tone
[No Rash] : no rash
[Coordination Grossly Intact] : coordination grossly intact
[No Focal Deficits] : no focal deficits
[Normal Gait] : normal gait
[Deep Tendon Reflexes (DTR)] : deep tendon reflexes were 2+ and symmetric
[Normal Affect] : the affect was normal
[Normal Insight/Judgement] : insight and judgment were intact